# Patient Record
Sex: FEMALE | Race: WHITE | Employment: UNEMPLOYED | URBAN - NONMETROPOLITAN AREA
[De-identification: names, ages, dates, MRNs, and addresses within clinical notes are randomized per-mention and may not be internally consistent; named-entity substitution may affect disease eponyms.]

---

## 2021-01-14 ENCOUNTER — HOSPITAL ENCOUNTER (INPATIENT)
Age: 26
LOS: 2 days | Discharge: HOME | DRG: 751 | End: 2021-01-16
Attending: PSYCHIATRY & NEUROLOGY | Admitting: PSYCHIATRY & NEUROLOGY
Payer: MEDICAID

## 2021-01-14 PROBLEM — F43.10 PTSD (POST-TRAUMATIC STRESS DISORDER): Status: ACTIVE | Noted: 2021-01-14

## 2021-01-14 PROCEDURE — 6370000000 HC RX 637 (ALT 250 FOR IP): Performed by: PSYCHIATRY & NEUROLOGY

## 2021-01-14 PROCEDURE — 1240000000 HC EMOTIONAL WELLNESS R&B

## 2021-01-14 RX ORDER — TRAZODONE HYDROCHLORIDE 50 MG/1
50 TABLET ORAL NIGHTLY PRN
Status: DISCONTINUED | OUTPATIENT
Start: 2021-01-14 | End: 2021-01-16 | Stop reason: HOSPADM

## 2021-01-14 RX ORDER — MAGNESIUM HYDROXIDE/ALUMINUM HYDROXICE/SIMETHICONE 120; 1200; 1200 MG/30ML; MG/30ML; MG/30ML
30 SUSPENSION ORAL EVERY 6 HOURS PRN
Status: DISCONTINUED | OUTPATIENT
Start: 2021-01-14 | End: 2021-01-16 | Stop reason: HOSPADM

## 2021-01-14 RX ORDER — IBUPROFEN 400 MG/1
400 TABLET ORAL EVERY 6 HOURS PRN
Status: DISCONTINUED | OUTPATIENT
Start: 2021-01-14 | End: 2021-01-16 | Stop reason: HOSPADM

## 2021-01-14 RX ORDER — ACETAMINOPHEN 325 MG/1
650 TABLET ORAL EVERY 4 HOURS PRN
Status: DISCONTINUED | OUTPATIENT
Start: 2021-01-14 | End: 2021-01-16 | Stop reason: HOSPADM

## 2021-01-14 RX ORDER — HYDROXYZINE HYDROCHLORIDE 25 MG/1
50 TABLET, FILM COATED ORAL 3 TIMES DAILY PRN
Status: DISCONTINUED | OUTPATIENT
Start: 2021-01-14 | End: 2021-01-16 | Stop reason: HOSPADM

## 2021-01-14 RX ADMIN — TRAZODONE HYDROCHLORIDE 50 MG: 50 TABLET ORAL at 21:28

## 2021-01-14 ASSESSMENT — SLEEP AND FATIGUE QUESTIONNAIRES
AVERAGE NUMBER OF SLEEP HOURS: 5
DIFFICULTY STAYING ASLEEP: YES
DIFFICULTY ARISING: NO
DO YOU HAVE DIFFICULTY SLEEPING: YES
DO YOU USE A SLEEP AID: YES

## 2021-01-14 ASSESSMENT — PATIENT HEALTH QUESTIONNAIRE - PHQ9: SUM OF ALL RESPONSES TO PHQ QUESTIONS 1-9: 27

## 2021-01-14 ASSESSMENT — LIFESTYLE VARIABLES: HISTORY_ALCOHOL_USE: NO

## 2021-01-14 ASSESSMENT — PAIN SCALES - GENERAL: PAINLEVEL_OUTOF10: 0

## 2021-01-15 PROBLEM — F33.2 SEVERE EPISODE OF RECURRENT MAJOR DEPRESSIVE DISORDER, WITHOUT PSYCHOTIC FEATURES (HCC): Status: ACTIVE | Noted: 2021-01-14

## 2021-01-15 PROCEDURE — APPSS30 APP SPLIT SHARED TIME 16-30 MINUTES: Performed by: PHYSICIAN ASSISTANT

## 2021-01-15 PROCEDURE — 99223 1ST HOSP IP/OBS HIGH 75: CPT | Performed by: PSYCHIATRY & NEUROLOGY

## 2021-01-15 PROCEDURE — 1240000000 HC EMOTIONAL WELLNESS R&B

## 2021-01-15 PROCEDURE — 6370000000 HC RX 637 (ALT 250 FOR IP): Performed by: PSYCHIATRY & NEUROLOGY

## 2021-01-15 PROCEDURE — 6370000000 HC RX 637 (ALT 250 FOR IP): Performed by: PHYSICIAN ASSISTANT

## 2021-01-15 RX ADMIN — TRAZODONE HYDROCHLORIDE 50 MG: 50 TABLET ORAL at 20:35

## 2021-01-15 RX ADMIN — IBUPROFEN 400 MG: 400 TABLET, FILM COATED ORAL at 08:19

## 2021-01-15 RX ADMIN — SERTRALINE 25 MG: 50 TABLET, FILM COATED ORAL at 16:44

## 2021-01-15 NOTE — PROGRESS NOTES
[facility] Biopsychosocial Assessment    Current Level of Psychosocial Functioning     Independent XXX    Dependent    Minimal Assist     Comments:      Psychosocial High Risk Factors (check all that apply)    Unable to obtain meds   Chronic illness/pain    Substance abuse   Lack of Family Support   Financial stress   Isolation   Inadequate Community Resources XXX  Suicide attempt(s)  Not taking medications   Victim of crime   Developmental Delay  Unable to manage personal needs    Age 73 or older   Homeless  No transportation   Readmission within 30 days  Unemployment XXX  Traumatic Event    Psychiatric Advanced Directive: None    Family to involve in treatment: Parents, Boyfriend, as needed    Sexual Orientation:  heterosexual    Patient Strengths: insightful, good communication, positive support, outpatient provider (therapist)     Patient Barriers: mood instability, unemployment    Opiate education provided: not indicated    Safety plan: ongoing, close watch, Q15 minute safety check    CMHC/MH history: Yes, see clinical summary    Plan of Care:  medication management, group/individual therapies, family meetings, psycho -education, treatment team meetings to assist with stabilization    Initial Discharge Plan:  Plans to return to home with boyfriend in [facility]. Recovery upon discharge. Patient is currently seeing therapist at [facility] in [facility]. Patient would like to be linked with psychiatrist upon discharge.

## 2021-01-15 NOTE — PROGRESS NOTES
This RN has reviewed and agrees with CRUZITO Simeon LPN's data collection and has collaborated with this LPN regarding the patient's care plan.

## 2021-01-15 NOTE — PATIENT CARE CONFERENCE
[facility]  Initial Interdisciplinary Treatment Plan NOTE    Review Date & Time: 1/15/21 1021    Patient was in treatment team.  See Multidisciplinary Treatment Team sheet for participants. Admission Type:   Admission Type: Involuntary    Reason for admission:  Reason for Admission: Suicidal Ideation. Estimated Length of Stay Update:  3-5 days  Estimated Discharge Date Update: 3-5 days    PATIENT STRENGTHS:  Patient Strengths Strengths: Communication, Connection to output provider, No significant Physical Illness, Positive Support  Patient Strengths and Limitations:Limitations: Unrealistic self-view, General negative or hopeless attitude about future/recovery, Hopeless about future, Tendency to isolate self  Addictive Behavior:Addictive Behavior  In the past 3 months, have you felt or has someone told you that you have a problem with:  : None  Do you have a history of Chemical Use?: No  Do you have a history of Alcohol Use?: No  Do you have a history of Street Drug Abuse?: No  Histroy of Prescripton Drug Abuse?: No  Medical Problems:  Past Medical History:   Diagnosis Date    Psychiatric problem        EDUCATION:   Learner Progress Toward Treatment Goals: Reviewed results and recommendations of this team, Reviewed group plan and strategies, Reviewed signs, symptoms and risk of self harm and violent behavior and Reviewed goals and plan of care    Method: Individual    Outcome: Verbalized understanding and Needs reinforcement    PATIENT GOALS: Medication Management    PLAN/TREATMENT RECOMMENDATIONS UPDATE:   1. What is the most important thing we can help you with while you are here? - See above     2. Who is your support system? - Boyfriend     3. Do you have follow-up providers? - [facility]     4. Do you have the ability to pay for your medications? - No needs [facility]    5.  Where will you be residing when you leave the hospital?  - Home with boyfriend 6. Will need a return to work slip or FMLA paper completion?  - No      GOALS UPDATE:   Time frame for Short-Term Goals: Daily     GEORGI Bal

## 2021-01-15 NOTE — PLAN OF CARE
Problem: Depressive Behavior With or Without Suicide Precautions:  Goal: Able to verbalize acceptance of life and situations over which he or she has no control  Description: Able to verbalize acceptance of life and situations over which he or she has no control  Outcome: Ongoing  Note: Patient able to verbalize acceptance of life and situations over which he has no control. Goal: Able to verbalize and/or display a decrease in depressive symptoms  Description: Able to verbalize and/or display a decrease in depressive symptoms  Outcome: Ongoing  Note: Patient able to verbalize and display a decrease in depressive symptoms   Goal: Ability to disclose and discuss suicidal ideas will improve  Description: Ability to disclose and discuss suicidal ideas will improve  Outcome: Ongoing  Note: Patient able to disclose and discuss suicidal ideas will  improve   Goal: Absence of self-harm  Description: Absence of self-harm  Outcome: Ongoing  Note: No self harm behaviors were observed or reported so far this shift. Remains on every 15 minutes precautions for safety. Goal: Patient specific goal  Description: Patient specific goal  Outcome: Ongoing  Note: Patient did state specific goal   Goal: Participates in care planning  Description: Participates in care planning  Outcome: Ongoing  Note: Patient participated in care planning      Problem: Anxiety:  Goal: Level of anxiety will decrease  Description: Level of anxiety will decrease  Outcome: Ongoing  Note: Patient level of anxiety will decrease      Problem: Discharge Planning:  Goal: Discharged to appropriate level of care  Description: Discharged to appropriate level of care  Outcome: Ongoing  Note: Discharge planner working with patient to achieve optimal discharge plan, specific to the needs of this patient. Problem:  Activity:  Goal: Sleeping patterns will improve  Description: Sleeping patterns will improve  Outcome: Ongoing

## 2021-01-15 NOTE — BH NOTE
PLAN OF CARE:     Start Time:  0900  End Time:   0930    Group Topic:  Daily Goals    Group Type:   Goal Group    Intervention/Goal:  To increase support and identify daily goals    Attendance:   Participated in group    Affect:   Brightens with interaction    Behavior:   Cooperative and pleasant    Response:   appropriate    Daily Goal:    Chill out. Have a good day.      Progress:  Progressing to goal

## 2021-01-15 NOTE — PLAN OF CARE
Patient has participated in one group today but has not attended any of the groups this shift so she is working toward her socialization goal. Patient will be encouraged to attend all groups on the unit daily and to come out of her room to socialize with others during her hospital stay.

## 2021-01-15 NOTE — H&P
Vance Salgado reports \"I was at a therapist appointment yesterday at [facility]. Yesterday happened to be a bad day. I was trying to explain my thought process. I told her I didn't want to be here in that place. My therapist called the EMS and they took me there and brought me here. \" She states she was feeling more depressed than usual yesterday and was having intrusive thoughts. She reports her intrusive thoughts are suicidal thoughts that pop up for a little bit and come and go. She states yesterday, she thought of a plan to overdose. She denies intent. She reports she gets intrusive thoughts about a few times a month but they were more intense and harder to ignore yesterday. She states her depression has been coming and going but it was bad yesterday. She denies any specific stressors yesterday but states she has been stressed out with finances recently. She endorses feeling down and sad for more days than not. She reports she has been sleeping pretty well. She endorses a little trouble falling and staying asleep. She feels tired most days when she wakes up in the morning. Her energy and motivation have been average. Her appetite has been pretty good. She has been feeling worthless on her bad days. She reports she has social anxiety but it has been manageable recently. She has panic attacks rarely. Her last panic attack was in December when she quit her job at [employer]. She reports when she has a panic attach, she gest shaky, short of breath and a fuzzy head. She reports they only last a few minutes. She states she experiences rapid mood swings. She reports \"one minute Ill be good. Then ill get upset and mad then Ill be fine again. Michelle had a temper since I was a kid. \" BP (!) 141/95   Pulse 95   Temp 97.6 °F (36.4 °C) (Tympanic)   Resp 18   Ht 5' 1\" (1.549 m)   Wt 176 lb (79.8 kg)   LMP 01/14/2021 (Exact Date)   SpO2 98%   Breastfeeding No   BMI 33.25 kg/m²       Physical Exam:    Constitutional: Well developed, well nourished, no acute distress  Eyes: Pupils round and reactive to light bilaterally  Neck:  Supple, no thyromegaly. Cardiovascular:  Normal rate and rhythm, normal S1 and S2. No murmur or gallop on auscultation. Radial pulses 2+ and brisk bilaterally  Lungs: Clear to auscultation bilaterally without wheezing or rales. Musculoskeletal:  Full range of motion in all four extremities. Neurologic:  Cranial nerves II through XII are grossly intact. Normal gait and station.        Mental Status Examination:    Level of consciousness:  awake  Appearance:  well-appearing, hospital attire, in chair, good grooming and good hygiene  Behavior/Motor:  no abnormalities noted  Attitude toward examiner:  cooperative, attentive and good eye contact  Speech:  spontaneous, normal rate and normal volume  Mood:  \"good\" per patient  Affect:  blunted  Thought processes:  linear, goal directed and coherent  Thought content:  Denies homicidal ideation  Suicidal Ideation:  denies current suicidal ideation  Delusions:  no evidence of delusions  Perceptual Disturbance:  denies any perceptual disturbance  Cognition: Patient is oriented to person, place, time and situation  Concentration: clinically adequate  Memory: intact  Insight & Judgement: fair         DSM-5 DIAGNOSIS:    Severe episode of recurrent MDD without psychotic features  Anxiety  PTSD      Patient Active Problem List   Diagnosis    PTSD (post-traumatic stress disorder)          Psychosocial and Contextual Factors:   Financial  Occupational  Relationship  Legal   Living situation  Educational     Past Medical History:   Diagnosis Date    Psychiatric problem           TREATMENT PLAN Risk Management:  close watch per standard protocol  Psychotherapy:  participation in milieu and group and individual sessions with Attending Physician,  and Physician Assistant/CNP  Reason for Admission to Psychiatric Unit:  Threat to self  Estimated length of stay: It might take more than 2 midnights to stabilize patient's mood/thoughts and titrate medications to effect. GENERAL PATIENT/FAMILY EDUCATION  Patient will understand basic signs and symptoms, Patient will understand benefits/risks and potential side effects from proposed meds and Patient will understand their role in recovery. Family is  active in patient's care. Patient assets that may be helpful during treatment include: Intent to participate and engage in treatment, sufficient fund of knowledge and intellect to understand and utilize treatments. Risk level: High     Goals:    Reviewed labs  Reviewed EKG  Will obtain records and review them today. Medication adjustment: Add Zoloft 25mg daily   Consults: None   Encouraged patient to engage in groups, milieu, and individual therapies offered as part of programing. Behavioral Services  Medicare Certification Upon Admission    I certify that this patient's inpatient psychiatric hospital admission is medically necessary for:   X (1) Treatment which could reasonably be expected to improve this patient's condition,      X (2) Or for diagnostic study;     AND     X (2) The inpatient psychiatric services are provided while the individual is under the care of a physician and are included in the individualized plan of care. Estimated length of stay/service: Greater than two midnights will be required to reach therapeutic levels of medications and to stabilize mood    Plan for post-hospital care:  Follow up with outpatient psychiatric services    Electronically signed by Kami Nesbitt MD on 1/15/21 at 8:24 PM EST

## 2021-01-15 NOTE — PROGRESS NOTES
Behavioral Health   Admission Note     Admission Type:   Admission Type: Involuntary    Reason for admission:  Reason for Admission: Suicidal Ideation. PATIENT STRENGTHS:  Strengths: Communication, Connection to output provider, No significant Physical Illness, Positive Support    Patient Strengths and Limitations:  Limitations: Unrealistic self-view, General negative or hopeless attitude about future/recovery, Hopeless about future, Tendency to isolate self    Addictive Behavior:   Addictive Behavior  In the past 3 months, have you felt or has someone told you that you have a problem with:  : None  Do you have a history of Chemical Use?: No  Do you have a history of Alcohol Use?: No  Do you have a history of Street Drug Abuse?: No  Histroy of Prescripton Drug Abuse?: No    Medical Problems:   Past Medical History:   Diagnosis Date    Psychiatric problem        Status EXAM:  Status and Exam  Normal: No  Facial Expression: Avoids Gaze, Flat, Sad, Worried  Affect: Blunt, Congruent  Level of Consciousness: Alert  Mood:Normal: No  Mood: Depressed, Anxious, Sad  Motor Activity:Normal: No  Motor Activity: Decreased  Interview Behavior: Cooperative  Preception: [place] to Person, [place] to Time, [place] to Place, [place] to Situation  Attention:Normal: No  Attention: Distractible  Thought Processes: Circumstantial  Thought Content:Normal: Yes  Hallucinations: None  Delusions: No  Memory:Normal: No  Memory: Poor Recent  Insight and Judgment: No  Insight and Judgment: Poor Judgment, Poor Insight  Present Suicidal Ideation: No  Present Homicidal Ideation: No    Pt admitted with followings belongings:  Dentures: None  Vision - Corrective Lenses: Glasses  Hearing Aid: None  Jewelry: None  Body Piercings Removed: No  Clothing:  Footwear, Jacket / coat, Pants, Shirt, Socks  Were All Patient Medications Collected?: Not Applicable Other Valuables: Cell phone, Money (Comment), Purse, DAY, [place], Other (Comment)($25 cash, large amount of coins, ear buds, wallet with ID and cards)     Admission order obtained Yes. Valuables sent home [address]. Valuables placed in safe in security envelope, number:  NA. Patient's home medications were na. Patient oriented to surroundings and program expectations and copy of patient rights given. Received admission packet:  Yes. Consents reviewed, signed Yes. \"An Important Message from [facility] About Your Rights\" form reviewed, signed NA. Refused NA. Patient verbalize understanding:  Yes. Patient education on precautions: Yes           Patient screened positive for suicide risk on CSSR-S (\"yes\" to question #4, 5, OR 6)  NA. Physician notified of risk score. Orders received NA .  2 person skin assessment completed upon admission Refused. Explained patients right to have family, representative or physician notified of their admission. Patient has Declined for physician to be notified. Patient has Declined for family/representative to be notified. Provided pt with [employer] handout entitled \"Quitting Smoking. \"  Reviewed handout with pt addressing dangers of smoking, developing coping skills, and providing basic information about quitting.   Pt response to counseling:  JAMARI Castillo RN

## 2021-01-15 NOTE — PLAN OF CARE
Problem: Depressive Behavior With or Without Suicide Precautions:  Goal: Able to verbalize acceptance of life and situations over which he or she has no control  Description: Able to verbalize acceptance of life and situations over which he or she has no control  Outcome: Ongoing  Note: Patient reports that \"she doesn't know why she is here. \" Did not discuss life situations this shift. Goal: Able to verbalize and/or display a decrease in depressive symptoms  Description: Able to verbalize and/or display a decrease in depressive symptoms  Outcome: Ongoing  Note: Patient denies depression at this time. Goal: Ability to disclose and discuss suicidal ideas will improve  Description: Ability to disclose and discuss suicidal ideas will improve  Outcome: Ongoing  Note: Suicidal thoughts denied. Goal: Absence of self-harm  Description: Absence of self-harm  Outcome: Ongoing  Note: Patient remains safe and free from harm. Goal: Patient specific goal  Description: Patient specific goal  Outcome: Ongoing  Note: No goal voiced at this time. Goal: Participates in care planning  Description: Participates in care planning  Outcome: Ongoing  Note: No participation this shift. Problem: Anxiety:  Goal: Level of anxiety will decrease  Description: Level of anxiety will decrease  Outcome: Ongoing  Note: Patient denies anxiety and appears relaxed. Problem: Discharge Planning:  Goal: Discharged to appropriate level of care  Description: Discharged to appropriate level of care  Outcome: Ongoing  Note: Discharge planning is in progress. Care plan reviewed with patient.   Patient does verbalize understanding of the plan of care and does not contribute to goal setting

## 2021-01-15 NOTE — PROGRESS NOTES
Group Therapy Note    Date: 1/15/2021  Start Time: 1330  End Time:  1440  Number of Participants: 7    Type of Group: Psychotherapy      Notes:  Pt is present for group with appropriate interaction. Introduced self to 2 new peers. Peers discussed proactive stress management and coping skills. Status After Intervention:  Improved    Participation Level:  Active Listener and Interactive    Participation Quality: Appropriate, Attentive, Sharing and Supportive      Speech:  normal      Thought Process/Content: Logical  Linear      Affective Functioning: Congruent      Mood: dysphoric      Level of consciousness:  Alert, Oriented x4 and Attentive      Response to Learning: Able to verbalize current knowledge/experience, Able to verbalize/acknowledge new learning, Able to retain information, Capable of insight, Able to change behavior and Progressing to goal      Endings: None Reported    Modes of Intervention: Education, Support, Socialization, Exploration and Clarifying      Discipline Responsible: /Counselor      Signature:  GEORGI Wren

## 2021-01-15 NOTE — BH NOTE
INPATIENT RECREATIONAL THERAPY  ADULT BEHAVIORAL SERVICES  EVALUATION    REFERRING PHYSICIAN:    Dr. Elias Ku  DIAGNOSIS:    Post Traumatic Stress Disorder  PRECAUTIONS:   Standard precautions    HISTORY OF PRESENT ILLNESS/INJURY:    Patient was admitted to the unit due to suicidal ideaiton and depression. Patient reported having thoughts of taking an overdose prior to admission. Patient stated that she has been experiencing a lot of mood swings lately. Patient denies all and reported, \"I don't know why I am here. \" Patient reported that she has PTSD due to her parents having a toxic relationship and getting . Patient was pleasant and cooperative with a bright affect. PMH:  Please see medical chart for prior medical history, allergies, and medication    HISTORY OF PSYCHIATRIC TREATMENT:  OP:  [facility]    DATE OF BIRTH:  [birth year]   GENDER:   female  MARITAL STATUS:  Patient has a boyfriend. EMPLOYMENT STATUS:  Unemployed    LIVING SITUATION/SUPPORT:  Lives with her boyfriend. EDUCATIONAL LEVEL:    graduate  MEDICATION/DRUG USE:  Medication compliance. LEISURE INTERESTS:   Family activities, activities with boyfriend/friends, watching TV and movies, listening to music, spiritual activities, journaling/writing  ACTIVITY PREFERENCE:   Small group  ACTIVITY TYPES:   Active. Indoor. Outdoor. Passive. COGNITION:  A&Ox4    COPING:   poor  ATTENTION:  fair  RELAXATION:   Patient reported poor sleep, nightmares and anxiety. SELF-ESTEEM:   poor  MOTIVATION:   Fair - limited insight - patient stated, \"I don't know why I am here. \"    SOCIAL SKILLS:  good  FRUSTRATION TOLERANCE:  Fair - patient reported mood swings lately.    ATTENTION SEEKING:   None noted  COOPERATION:  Pleasant and cooperative   AFFECT:  bright  APPEARANCE:  appropriate    HEARING:   No problems noted  VISION:    Corrected with glasses  VERBAL COMMUNICATION:   No problems noted  WRITTEN COMMUNICATION:   No problems noted COORDINATION:   No problems noted  MOBILITY:  Ambulates independently     GOALS:  (1) Identify 2 new positive coping skills by time of discharge. (2) Increase socialization by attending groups on the unit daily during her hospital stay.

## 2021-01-16 VITALS
DIASTOLIC BLOOD PRESSURE: 87 MMHG | TEMPERATURE: 96 F | RESPIRATION RATE: 18 BRPM | HEART RATE: 84 BPM | OXYGEN SATURATION: 98 % | WEIGHT: 176 LBS | SYSTOLIC BLOOD PRESSURE: 139 MMHG | HEIGHT: 61 IN | BODY MASS INDEX: 33.23 KG/M2

## 2021-01-16 PROCEDURE — 5130000000 HC BRIDGE APPOINTMENT

## 2021-01-16 PROCEDURE — APPSS15 APP SPLIT SHARED TIME 0-15 MINUTES: Performed by: NURSE PRACTITIONER

## 2021-01-16 PROCEDURE — 6370000000 HC RX 637 (ALT 250 FOR IP): Performed by: PHYSICIAN ASSISTANT

## 2021-01-16 PROCEDURE — 99239 HOSP IP/OBS DSCHRG MGMT >30: CPT | Performed by: PSYCHIATRY & NEUROLOGY

## 2021-01-16 RX ORDER — SERTRALINE HYDROCHLORIDE 25 MG/1
25 TABLET, FILM COATED ORAL DAILY
Qty: 30 TABLET | Refills: 0 | Status: SHIPPED | OUTPATIENT
Start: 2021-01-17

## 2021-01-16 RX ADMIN — SERTRALINE 25 MG: 50 TABLET, FILM COATED ORAL at 08:34

## 2021-01-16 ASSESSMENT — PAIN SCALES - GENERAL: PAINLEVEL_OUTOF10: 0

## 2021-01-16 NOTE — PROGRESS NOTES
Behavioral Health   Discharge Note    Pt discharged with followings belongings:   Dentures: None  Vision - Corrective Lenses: Glasses  Hearing Aid: None  Jewelry: None  Body Piercings Removed: No  Clothing: Footwear, Jacket / coat, Pants, Shirt, Socks  Were All Patient Medications Collected?: Not Applicable  Other Valuables: Cell phone, Money (Comment), Purse, DAY, [address], Other (Comment)   Valuables sent home with patient. Valuables retrieved from safe, Security envelope number:  N/A and returned to patient. Patient left department with staff via ambulatory. Discharged to private residence. \"An Important Message from [facility] About Your Rights\" form photocopy original from admission and provided to pt at discharge N/A. Patient education on aftercare instructions: YES  Bridge Appointment completed: Reviewed Discharge Instructions with patient. Patient verbalizes understanding and agreement with the discharge plan using the teachback method. Patient verbalize understanding of AVS:  YES.     Status EXAM upon discharge:  Status and Exam  Normal: Yes  Facial Expression: Brightened  Affect: Blunt  Level of Consciousness: Alert  Mood:Normal: Yes  Mood: (euthymic)  Motor Activity:Normal: Yes  Motor Activity: Decreased  Interview Behavior: Cooperative  Preception: [place] to Person, [place] to Time, [place] to Place, [place] to Situation  Attention:Normal: No  Attention: Distractible  Thought Processes: Circumstantial  Thought Content:Normal: Yes  Hallucinations: None  Delusions: No  Memory:Normal: Yes  Memory: Poor Recent  Insight and Judgment: Yes  Insight and Judgment: Poor Judgment, Poor Insight  Present Suicidal Ideation: No  Present Homicidal Ideation: No    Yo Elias RN

## 2021-01-16 NOTE — PROGRESS NOTES
Psychiatry Progress Note      CC: Severe episode of recurrent MDD without psychotic features  Anxiety  PTSD                   Subjective    Progress:  Marianna Youngblood reports feeling better since admission. Reports depression is less. Denies feelings of self harm. States she never wanted to harm herself. Reports Zoloft recently started actually seem sot be helping. Denies side effects from meds. Good med compliance is verified. Reports appetite is good and slept well last night. Verified slept 7.5 hours continuous. States she attends groups. Reports boyfriend visited yesterday and visit went well. Objective  /79   Pulse 90   Temp 95.2 °F (35.1 °C) (Tympanic)   Resp 16   Ht 5' 1\" (1.549 m)   Wt 176 lb (79.8 kg)   LMP 01/14/2021 (Exact Date)   SpO2 98%   Breastfeeding No   BMI 33.25 kg/m²      MSE:  Level of consciousness: Alert  Appearance: hospital attire, in chair and fair grooming   Behavior/Motor: no abnormalities noted   Attitude toward examiner: cooperative   Speech: Normal volume, goal directed, NRR  Mood: Euthymic  Affect: Reactive  Thought processes: Linear and goal directed   Suicidal Ideation: Denies suicidal ideations  Homicidal ideation: Denies homicidal ideations  Delusions: No evidence of delusions is observed  Perceptual Disturbance: Denies AH/VH;  No evidence of psychosis is observed. Cognition: Oriented to person, place, time and situation   Concentration fair   Memory intact   Insight: Improved  Judgment: Improved    Assessment:  Severe episode of recurrent MDD without psychotic features  Anxiety  PTSD    Plan:  Continue current meds as ordered  Continue to encourage group attendance.       Codey Manzano, MATEUS  2-

## 2021-01-16 NOTE — BH NOTE
Group Therapy Note    Date: 1/15/2021  Start Time: 2000  End Time:  2020  Number of Participants: 1    Type of Group: Wrap-Up/Relaxation    Wellness Binder Information  Module Name:  None  Session Number:  None    Patient's Goal:  To observe group    Notes:  Ongoing    Status After Intervention:  Unchanged    Participation Level: Interactive    Participation Quality: Appropriate      Speech:  hesitant      Thought Process/Content: Logical      Affective Functioning: Blunted      Mood: depressed      Level of consciousness:  Oriented x4      Response to Learning: Able to retain information      Endings: None Reported    Modes of Intervention: Education      Discipline Responsible: Registered Nurse      Signature:   Anna Breen RN

## 2021-01-16 NOTE — PLAN OF CARE
Problem: Depressive Behavior With or Without Suicide Precautions:  Goal: Able to verbalize acceptance of life and situations over which he or she has no control  Description: Able to verbalize acceptance of life and situations over which he or she has no control  1/16/2021 0129 by Alexis Lorenzo RN  Outcome: Ongoing  Note: Patient verbalizes limited acceptance of situations over which she has no control. 1/15/2021 1708 by Kiersten Campo LPN  Outcome: Ongoing  Note: Patient able to verbalize acceptance of life and situations over which he has no control. Goal: Able to verbalize and/or display a decrease in depressive symptoms  Description: Able to verbalize and/or display a decrease in depressive symptoms  1/16/2021 0129 by Alexis Lorenzo RN  Outcome: Ongoing  Note: Patient noted with a blunt affect and brightens at times with interaction. Patient denies any feelings of depression this shift. 1/15/2021 1708 by Kiersten Campo LPN  Outcome: Ongoing  Note: Patient able to verbalize and display a decrease in depressive symptoms   Goal: Ability to disclose and discuss suicidal ideas will improve  Description: Ability to disclose and discuss suicidal ideas will improve  1/16/2021 0129 by Alexis Lorenzo RN  Outcome: Ongoing  Note: Patient denies any suicidal thoughts so far this shift. 1/15/2021 1708 by Kiersten Campo LPN  Outcome: Ongoing  Note: Patient able to disclose and discuss suicidal ideas will  improve   Goal: Absence of self-harm  Description: Absence of self-harm  1/16/2021 0129 by Alexis Lorenzo RN  Outcome: Ongoing  Note: None noted so far this shift. 1/15/2021 1708 by Kiersten Campo LPN  Outcome: Ongoing  Note: No self harm behaviors were observed or reported so far this shift. Remains on every 15 minutes precautions for safety.    Goal: Patient specific goal  Description: Patient specific goal  1/16/2021 0129 by Alexis Lorenzo RN Outcome: Ongoing  Note: Patient stated here goal was to observe. 1/15/2021 1708 by Ria Rosales LPN  Outcome: Ongoing  Note: Patient did state specific goal   Goal: Participates in care planning  Description: Participates in care planning  1/16/2021 0129 by Dimas Blanchard RN  Outcome: Ongoing  Note: Care plan reviewed with patient and fair understanding verbalized. 1/15/2021 1708 by Ria Rosales LPN  Outcome: Ongoing  Note: Patient participated in care planning      Problem: Anxiety:  Goal: Level of anxiety will decrease  Description: Level of anxiety will decrease  1/16/2021 0129 by Dimas Blanchard RN  Outcome: Ongoing  Note: Patient denies any anxiety this shift. 1/15/2021 1708 by Ria Rosales LPN  Outcome: Ongoing  Note: Patient level of anxiety will decrease      Problem: Discharge Planning:  Goal: Discharged to appropriate level of care  Description: Discharged to appropriate level of care  1/16/2021 0129 by Dimas Blanchard RN  Outcome: Ongoing  Note: Patient states she plans to return home with her boyfriend at discharge and continues to follow up at [address] [place] [address]. 1/15/2021 1708 by Ria Rosales LPN  Outcome: Ongoing  Note: Discharge planner working with patient to achieve optimal discharge plan, specific to the needs of this patient. Problem: Activity:  Goal: Sleeping patterns will improve  Description: Sleeping patterns will improve  1/16/2021 0129 by Dimas Blanchard RN  Outcome: Ongoing  Note: Patient states she feels her sleep pattern is starting to improve.   1/15/2021 1708 by Ria Rosales LPN  Outcome: Ongoing  Note: Patient sleeping pattern did improve      Problem: Coping:  Goal: Ability to identify problematic behaviors that deter socialization will improve  Description: Ability to identify problematic behaviors that deter socialization will improve  1/16/2021 0129 by Dimas Blanchard RN  Outcome: Ongoing
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
## 2021-01-16 NOTE — PROGRESS NOTES
Group Therapy Note    Date: 1/16/2021  Start Time: 0900  End Time:  0930  Number of Participants: 12    Type of Group: Community Meeting      Patient's Goal:  \"to be discharged\"      Status After Intervention:  Unchanged    Participation Level: Active Listener    Participation Quality: Appropriate      Speech:  normal      Thought Process/Content: Logical      Affective Functioning: Congruent      Mood: good       Level of consciousness:  Alert and Oriented x4      Response to Learning: Able to verbalize current knowledge/experience      Endings: None Reported    Modes of Intervention: Education      Discipline Responsible: Licensed Practical Nurse      Signature:  Candelaria Martinez.  Aki Cooley LPN

## 2021-01-16 NOTE — DISCHARGE INSTR - DIET
? Good nutrition is important when healing from an illness, injury, or surgery. Follow any nutrition recommendations given to you during your hospital stay. ? If you were given an oral nutrition supplement while in the hospital, continue to take this supplement at home. You can take it with meals, in-between meals, and/or before bedtime. These supplements can be purchased at most local grocery stores, pharmacies, and chain [employer]-stores. ? If you have any questions about your diet or nutrition, call the hospital and ask for the dietitian.

## 2021-01-16 NOTE — PROGRESS NOTES
7505 Clinician made rounds. Clinician introduced self and encouraged patient to check in with needs, questions, or concerns as they arise with . 0012 Discharge Planning:   Message left with Mary Caicedo for [facility] patient's counselor. Patient will go in for a walk in assessment on Monday between [id number].

## 2021-01-16 NOTE — PROGRESS NOTES
5 [facility]  Day 3 Interdisciplinary Treatment Plan NOTE    Review Date & Time: 1/16/2021 4471    Patient was in treatment team    Admission Type:   Admission Type: Involuntary    Reason for admission:  Reason for Admission: Suicidal Ideation.   Estimated Length of Stay Update:  3-5 days   Estimated Discharge Date Update: 3-5 days     PATIENT STRENGTHS:  Patient Strengths Strengths: Communication, Connection to output provider, No significant Physical Illness, Positive Support  Patient Strengths and Limitations:Limitations: Unrealistic self-view, General negative or hopeless attitude about future/recovery, Hopeless about future, Tendency to isolate self  Addictive Behavior:Addictive Behavior  In the past 3 months, have you felt or has someone told you that you have a problem with:  : None  Do you have a history of Chemical Use?: No  Do you have a history of Alcohol Use?: No  Do you have a history of Street Drug Abuse?: No  Histroy of Prescripton Drug Abuse?: No  Medical Problems:  Past Medical History:   Diagnosis Date    Psychiatric problem        Risk:  Fall RiskTotal: 53  Ilan Scale Ilan Scale Score: 21  BVC Total: 0    Status EXAM:   Status and Exam  Normal: No  Facial Expression: Brightened  Affect: Blunt  Level of Consciousness: Alert  Mood:Normal: Yes  Mood: Helpless  Motor Activity:Normal: No  Motor Activity: Decreased  Interview Behavior: Cooperative  Preception: Imogene to Person, Aicha Scrape to Time, Imogene to Place, Imogene to Situation  Attention:Normal: No  Attention: Distractible  Thought Processes: Circumstantial  Thought Content:Normal: Yes  Hallucinations: None  Delusions: No  Memory:Normal: No  Memory: Poor Recent  Insight and Judgment: No  Insight and Judgment: Poor Judgment, Poor Insight  Present Suicidal Ideation: No  Present Homicidal Ideation: No    Daily Assessment Last Entry:   Daily Sleep (WDL): Within Defined Limits         Patient Currently in Pain: Denies Daily Nutrition (WDL): Within Defined Limits    Patient Monitoring:  Frequency of Checks: 4 times per hour, close    Psychiatric Symptoms:   Depression Symptoms  Depression Symptoms: No problems reported or observed. Anxiety Symptoms  Anxiety Symptoms: No problems reported or observed. Samira Symptoms  Samira Symptoms: No problems reported or observed. Psychosis Symptoms  Delusion Type: No problems reported or observed. Suicide Risk CSSR-S:  1) Within the past month, have you wished you were dead or wished you could go to sleep and not wake up? : Yes  2) Have you actually had any thoughts of killing yourself? : Yes  3) Have you been thinking about how you might kill yourself? : No  5) Have you started to work out or worked out the details of how to kill yourself? Do you intend to carry out this plan? : No  6) Have you ever done anything, started to do anything, or prepared to do anything to end your life?: No        EDUCATION:   Learner Progress Toward Treatment Goals: Reviewed results and recommendations of this team, Reviewed group plan and strategies, Reviewed signs, symptoms and risk of self harm and violent behavior and Reviewed goals and plan of care    Method: Individual    Outcome: Verbalized understanding and Demonstrated Understanding    PATIENT GOALS: Patient identified the goal to manage medication. PLAN/TREATMENT RECOMMENDATIONS UPDATE:  1. How are you progressing toward meeting your main treatment goal?  Patient reports that her medication is working 'really well' for her. She feels at her 'highest' like she did a few years ago. Patient states she is getting better. 2.  Are there discharge barriers/lingering problems that need to be addressed? Patient will return to her own home with boyfriend and follow up with [facility] in [place]. 3.  Do you have the ability to pay for your medications? No      4. How is your group participation? Patient reports that she likes going to group to sit and listen to others. GOALS UPDATE:   Time frame for Short-Term Goals: Daily.       Chidi Boudreaux, MSW, LSW

## 2021-01-16 NOTE — SUICIDE SAFETY PLAN
SAFETY PLAN    A suicide Safety Plan is a document that supports someone when they are having thoughts of suicide. Warning Signs that indicate a suicidal crisis may be developing: What (situations, thoughts, feelings, body sensations, behaviors, etc.) do you experience that lets you know you are beginning to think about suicide? 1. FEELING USELESS  2. FEELING BETTER OFF NOT HERE  3. UNHAPPY WITH LIFE    Internal Coping Strategies:  What things can I do (relaxation techniques, hobbies, physical activities, etc.) to take my mind off my problems without contacting another person? 1. DRAWING  2. PLAYING [employer]ict[employer]  3. PLAYING VIDEO GAMES    People and social settings that provide distraction: Who can I call or where can I go to distract me? 1. Name: Reinaldo Martínez  Phone:   2. Name: Orlando Flores  Phone:    3. Place: Rodolfo Hernandez            4. Place:     People whom I can ask for help: Who can I call when I need help - for example, friends, family, clergy, someone else? 1. Name: Reinaldo Martínez                Phone:   2. Name: Orlando Flores  Phone:   3. Name: Rodolfo Hernandez  Phone:     Professionals or [address] [place], [place] [address]vd I can contact during a crisis: Who can I call for help - for example, my doctor, my psychiatrist, my psychologist, a mental health provider, a suicide hotline? 1. Clinician Name: Jeimy   Phone:       Clinician Pager or Emergency Contact #:     2. Clinician Name:    Phone:       Clinician Pager or Emergency Contact #:     3. Suicide Prevention Lifeline: 1-949-667-TALK (1478)    4. [address] [place] [address] Emergency Services -  for example, [facility] suicide hotline, [facility] Hotline:       Emergency Services Address:       Emergency Services Phone:     Making the environment safe: How can I make my environment (house/apartment/living space) safer? For example, can I remove guns, medications, and other items?   1.   2.

## 2021-01-18 ENCOUNTER — TELEPHONE (OUTPATIENT)
Dept: PSYCHIATRY | Age: 26
End: 2021-01-18

## 2021-01-18 NOTE — TELEPHONE ENCOUNTER
4E Post Discharge Call Back Program. Called patient. Patient did not answer. all other ROS negative except as per HPI